# Patient Record
Sex: FEMALE | Race: OTHER | HISPANIC OR LATINO | Employment: UNEMPLOYED | URBAN - METROPOLITAN AREA
[De-identification: names, ages, dates, MRNs, and addresses within clinical notes are randomized per-mention and may not be internally consistent; named-entity substitution may affect disease eponyms.]

---

## 2023-10-12 ENCOUNTER — TELEPHONE (OUTPATIENT)
Dept: ADMINISTRATIVE | Facility: OTHER | Age: 58
End: 2023-10-12

## 2023-10-12 NOTE — TELEPHONE ENCOUNTER
----- Message from Shantell Mercado RN sent at 10/11/2023 12:11 PM EDT -----  Regarding: PAP  10/11/23 12:15 PM    Hello, our patient Val Conti has had Pap Smear (HPV) aka Cervical Cancer Screening completed/performed. Please assist in updating the patient chart by pulling the document from EncountersTab within Chart Review. The date of service is 04/13/2023.      Thank you,  Shantell Mercado RN  [facility]

## 2023-10-12 NOTE — TELEPHONE ENCOUNTER
Upon review of the In Basket request we have found that the patient has not yet had the requested item completed or has not established care. Due to protocols, we are unable to hold requests for resulting/linking of a future items and are unable to proceed. Patients who have not established care within the Network do not have consents on file, record requests, etc.    Any additional questions or concerns should be emailed to the Practice Liaisons via the appropriate education email address, please do not reply via In Basket.     Thank you  Duane Pennington